# Patient Record
Sex: FEMALE | Race: WHITE
[De-identification: names, ages, dates, MRNs, and addresses within clinical notes are randomized per-mention and may not be internally consistent; named-entity substitution may affect disease eponyms.]

---

## 2020-06-16 ENCOUNTER — HOSPITAL ENCOUNTER (EMERGENCY)
Dept: HOSPITAL 8 - ED | Age: 45
Discharge: HOME | End: 2020-06-16
Payer: MEDICAID

## 2020-06-16 VITALS — HEIGHT: 67 IN | BODY MASS INDEX: 27.4 KG/M2 | WEIGHT: 174.61 LBS

## 2020-06-16 VITALS — DIASTOLIC BLOOD PRESSURE: 87 MMHG | SYSTOLIC BLOOD PRESSURE: 135 MMHG
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
## 2020-06-16 DIAGNOSIS — F17.200: ICD-10-CM

## 2020-06-16 DIAGNOSIS — G44.229: Primary | ICD-10-CM
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
## 2020-06-16 DIAGNOSIS — R41.82: ICD-10-CM

## 2020-06-16 LAB
ALBUMIN SERPL-MCNC: 3.4 G/DL (ref 3.4–5)
ANION GAP SERPL CALC-SCNC: 4 MMOL/L (ref 5–15)
BASOPHILS # BLD AUTO: 0.03 X10^3/UL (ref 0–0.1)
BASOPHILS NFR BLD AUTO: 0 % (ref 0–1)
CALCIUM SERPL-MCNC: 8.9 MG/DL (ref 8.5–10.1)
CHLORIDE SERPL-SCNC: 108 MMOL/L (ref 98–107)
CREAT SERPL-MCNC: 0.77 MG/DL (ref 0.55–1.02)
EOSINOPHIL # BLD AUTO: 0.27 X10^3/UL (ref 0–0.4)
EOSINOPHIL NFR BLD AUTO: 3 % (ref 1–7)
ERYTHROCYTE [DISTWIDTH] IN BLOOD BY AUTOMATED COUNT: 13.8 % (ref 9.6–15.2)
LYMPHOCYTES # BLD AUTO: 2.58 X10^3/UL (ref 1–3.4)
LYMPHOCYTES NFR BLD AUTO: 32 % (ref 22–44)
MCH RBC QN AUTO: 31.8 PG (ref 27–34.8)
MCHC RBC AUTO-ENTMCNC: 33.3 G/DL (ref 32.4–35.8)
MCV RBC AUTO: 95.3 FL (ref 80–100)
MD: NO
MONOCYTES # BLD AUTO: 0.5 X10^3/UL (ref 0.2–0.8)
MONOCYTES NFR BLD AUTO: 6 % (ref 2–9)
NEUTROPHILS # BLD AUTO: 4.72 X10^3/UL (ref 1.8–6.8)
NEUTROPHILS NFR BLD AUTO: 58 % (ref 42–75)
PLATELET # BLD AUTO: 350 X10^3/UL (ref 130–400)
PMV BLD AUTO: 8.7 FL (ref 7.4–10.4)
RBC # BLD AUTO: 4.41 X10^6/UL (ref 3.82–5.3)

## 2020-06-16 PROCEDURE — 96375 TX/PRO/DX INJ NEW DRUG ADDON: CPT

## 2020-06-16 PROCEDURE — 85025 COMPLETE CBC W/AUTO DIFF WBC: CPT

## 2020-06-16 PROCEDURE — 96361 HYDRATE IV INFUSION ADD-ON: CPT

## 2020-06-16 PROCEDURE — 82040 ASSAY OF SERUM ALBUMIN: CPT

## 2020-06-16 PROCEDURE — 80048 BASIC METABOLIC PNL TOTAL CA: CPT

## 2020-06-16 PROCEDURE — 96374 THER/PROPH/DIAG INJ IV PUSH: CPT

## 2020-06-16 PROCEDURE — 99284 EMERGENCY DEPT VISIT MOD MDM: CPT

## 2020-06-16 PROCEDURE — 70450 CT HEAD/BRAIN W/O DYE: CPT

## 2020-06-16 PROCEDURE — 36415 COLL VENOUS BLD VENIPUNCTURE: CPT

## 2020-06-16 NOTE — NUR
PT STATES PAIN DECREASED, MEDICATION EFFECTIVE. PT OK FOR D/C PER ERMD. PT 
GIVEN D/C INSTRUCTIONS, VERBALIZED UNDERSTANDING. PT WITH STEADY GAIT UPON D/C.

## 2020-06-16 NOTE — NUR
PT MEDICATE FOR PAIN PER ORDERS. PT ON MONITORS, VSS. PT'S MOTHER AT BEDSIDE. 
PT EDUCATED ON POC, VERBALIZED UNDERSTANDING. CONT TO MONITOR.

## 2021-03-27 ENCOUNTER — HOSPITAL ENCOUNTER (EMERGENCY)
Dept: HOSPITAL 8 - ED | Age: 46
Discharge: HOME | End: 2021-03-27
Payer: MEDICAID

## 2021-03-27 VITALS — WEIGHT: 160.94 LBS | HEIGHT: 67 IN | BODY MASS INDEX: 25.26 KG/M2

## 2021-03-27 VITALS — DIASTOLIC BLOOD PRESSURE: 86 MMHG | SYSTOLIC BLOOD PRESSURE: 128 MMHG

## 2021-03-27 DIAGNOSIS — F17.200: ICD-10-CM

## 2021-03-27 DIAGNOSIS — R51.9: Primary | ICD-10-CM

## 2021-03-27 DIAGNOSIS — Z86.39: ICD-10-CM

## 2021-03-27 PROCEDURE — 99283 EMERGENCY DEPT VISIT LOW MDM: CPT

## 2021-03-27 NOTE — NUR
Patient/Caregiver given discharge instructions and they have confirmed that 
they understand the instructions.  Patient ambulatory with steady gait. Taxi 
voucher provided for patient.

## 2021-03-27 NOTE — NUR
EMS states pt was brought in for feelings of depression and wanting help. Upon 
talking to pt, pt states she is unsure who called the ambulace. States she has 
a 10/10 Headache that she has had for 2 years. Pt States smoking meth x 1 day 
ago. Pt with scattered hx, difficulty to get story out of. Pt calm and 
cooperative in room. Pt denies having SI today, but states recently she has had 
feelings of harming herself by cutting her femoral arteries.